# Patient Record
Sex: FEMALE | Race: AMERICAN INDIAN OR ALASKA NATIVE | Employment: FULL TIME | URBAN - METROPOLITAN AREA
[De-identification: names, ages, dates, MRNs, and addresses within clinical notes are randomized per-mention and may not be internally consistent; named-entity substitution may affect disease eponyms.]

---

## 2017-10-10 DIAGNOSIS — H91.90 HEARING LOSS: Primary | ICD-10-CM

## 2017-10-11 ENCOUNTER — PRE VISIT (OUTPATIENT)
Dept: OTOLARYNGOLOGY | Facility: CLINIC | Age: 37
End: 2017-10-11

## 2017-10-11 NOTE — TELEPHONE ENCOUNTER
APPT INFORMATION    Date & Time:  10/20/17 at 9:30AM   Reason for Appt:  Impacted cerumen   Referring Name/Clinic:  self    Yes / No COMMENT / NOTES DATE & ACTION   Patient Contacted?  no  Records in Epic                     RECORDS CLINIC NAME  (use N/A if no records ) DATE & ACTION RECEIVED RECS & IMG? Y/N   (may include other helpful notes)   External Clinics:  n/a                 Internal Clinics:  [facility]   yes - former pt - last seen 3/14/14 -  records in [facility]

## 2017-10-20 ENCOUNTER — OFFICE VISIT (OUTPATIENT)
Dept: OTOLARYNGOLOGY | Facility: CLINIC | Age: 37
End: 2017-10-20

## 2017-10-20 ENCOUNTER — OFFICE VISIT (OUTPATIENT)
Dept: AUDIOLOGY | Facility: CLINIC | Age: 37
End: 2017-10-20
Attending: OTOLARYNGOLOGY

## 2017-10-20 VITALS — HEIGHT: 69 IN | BODY MASS INDEX: 31.1 KG/M2 | WEIGHT: 210 LBS

## 2017-10-20 DIAGNOSIS — H91.90 HEARING LOSS: ICD-10-CM

## 2017-10-20 DIAGNOSIS — D18.1: Primary | ICD-10-CM

## 2017-10-20 DIAGNOSIS — H90.11 CONDUCTIVE HEARING LOSS OF RIGHT EAR WITH UNRESTRICTED HEARING OF LEFT EAR: Primary | ICD-10-CM

## 2017-10-20 DIAGNOSIS — H92.11 OTORRHEA OF RIGHT EAR: ICD-10-CM

## 2017-10-20 ASSESSMENT — PAIN SCALES - GENERAL: PAINLEVEL: MODERATE PAIN (4)

## 2017-10-20 NOTE — PROGRESS NOTES
AUDIOLOGY REPORT    SUMMARY: Audiology visit completed. See audiogram for results.      RECOMMENDATIONS: Follow-up with ENT.    David Chaney  Licensed Audiologist  [place] License #[id number]

## 2017-10-20 NOTE — NURSING NOTE
Chief Complaint   Patient presents with     Consult     impacted ear wax     Jane Altman Medical Assistant

## 2017-10-20 NOTE — LETTER
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 She has undergone significant ear surgery over the years and has had a parotid and neck procedure. There was a significant amount of bleeding with associated with her surgery and she elected not to do any further interventions. Recently, she notes that her right ear is infected and this occurs every month or two in duration. She reports that she has been on antibiotics for at least six months. She has been noted to have increased white counts and some of the visits. She has had both an MRI and CT scan done at [facility].    Physical Examination: Exam today shows that the left tympanic membrane is anatomically intact and looks normal. Her right face is about status quo from my memory. There is swelling in the area of the parotid and the ear canal was abnormal. I was able to clean out the canal and remove a significant amount of cerumen. The tegmen area is soft and causes pain when I elevated. Into the space of the ear canal there is moist debris still medial to the area where I am elevating. The facial remains a House Brackmann class II on the right. The neck has multiple scars and is hard to evaluate. She has not had radiotherapy    I do not have the records or images today.    Impression: This probably represents progression of her lymphangioma.    Plan: I will try to assemble all of her records including the paper records here from the [place], or from [place] and [place], [place]. I will try to arrange for an audiogram now and an appointment with our staff concerning this condition.      SL/ms Lucio Short MD

## 2017-10-20 NOTE — MR AVS SNAPSHOT
After Visit Summary   10/20/2017    Leslie Weathers    MRN: [id number]           Patient Information     Date Of Birth          1980        Visit Information        Provider Department      10/20/2017 9:30 AM Lucio Short MD [facility] Ear Nose and Throat        Care Instructions    1.  PETE filled out at today's visit.   -We will work to obtain recent MRI/CT scans from [facility].  2.  We will also track down your paper chart with the [facility].  3.  Once we receive all of your previous medical files, we will have both Dr. Ragsdale & Dr. Santana review them.  4.  Once both physicians have reviewed your information, we will contact you for an appointment.  5.  Should you have any questions, please call our office: [phone], option # 3.          Follow-ups after your visit        Who to contact     Please call your clinic at [phone] to:    Ask questions about your health    Make or cancel appointments    Discuss your medicines    Learn about your test results    Speak to your doctor   If you have compliments or concerns about an experience at your clinic, or if you wish to file a complaint, please contact [facility] Patient Relations at [phone] or email us at [email or web address]         Additional Information About Your Visit        MyChart Information     Ioxus is an electronic gateway that provides easy, online access to your medical records. With Ioxus, you can request a clinic appointment, read your test results, renew a prescription or communicate with your care team.     To sign up for OnSwipet visit the website at [email or web address]   You will be asked to enter the access code listed below, as well as some personal information. Please follow the directions to create your username and password.     Your access code is: [email or web address]  Expires: 2018  6:31 AM     Your access code will  in 90 days. If you need help or a new  "code, please contact your [facility] or call [phone] for assistance.        Care EveryWhere ID     This is your Care EveryWhere ID. This could be used by other organizations to access your [facility] medical records  [id number]        Your Vitals Were     Height BMI (Body Mass Index)                1.753 m (5' 9\") 31.01 kg/m2           Blood Pressure from Last 3 Encounters:   03/27/12 103/61   03/26/12 130/68   01/11/12 110/70    Weight from Last 3 Encounters:   10/20/17 95.3 kg (210 lb)   03/27/12 107.9 kg (237 lb 14 oz)   03/26/12 106.9 kg (235 lb 10.8 oz)              Today, you had the following     No orders found for display       Primary Care Provider Office Phone # Fax #    Kyle Cardoza [phone]       [address]  [facility] [address]        Equal Access to Services     LEXI BECKHAM : Hadii aad ku hadasho Soomaali, waaxda luqadaha, qaybta kaalmada adeegyada, waxay idiin hayaan lashay del valle . So [facility] [phone].    ATENCIÓN: Si habla español, tiene a short disposición servicios gratuitos de asistencia lingüística. Taniaame al [phone].    We comply with applicable federal civil rights laws and [place] laws. We do not discriminate on the basis of race, color, national origin, age, disability, sex, sexual orientation, or gender identity.            Thank you!     Thank you for choosing [facility] EAR NOSE AND THROAT  for your care. Our goal is always to provide you with excellent care. Hearing back from our patients is one way we can continue to improve our services. Please take a few minutes to complete the written survey that you may receive in the mail after your visit with us. Thank you!             Your Updated Medication List - Protect others around you: Learn how to safely use, store and throw away your medicines at www.disposemymeds.org.      Notice  As of 10/20/2017  9:35 AM    You have not been prescribed any " medications.

## 2017-10-20 NOTE — PROGRESS NOTES
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 She has undergone significant ear surgery over the years and has had a parotid and neck procedure. There was a significant amount of bleeding with associated with her surgery and she elected not to do any further interventions. Recently, she notes that her right ear is infected and this occurs every month or two in duration. She reports that she has been on antibiotics for at least six months. She has been noted to have increased white counts and some of the visits. She has had both an MRI and CT scan done at [facility].    Physical Examination: Exam today shows that the left tympanic membrane is anatomically intact and looks normal. Her right face is about status quo from my memory. There is swelling in the area of the parotid and the ear canal was abnormal. I was able to clean out the canal and remove a significant amount of cerumen. The tegmen area is soft and causes pain when I elevated. Into the space of the ear canal there is moist debris still medial to the area where I am elevating. The facial remains a House Brackmann class II on the right. The neck has multiple scars and is hard to evaluate. She has not had radiotherapy    I do not have the records or images today.    Impression: This probably represents progression of her lymphangioma.    Plan: I will try to assemble all of her records including the paper records here from the [place], or from [place] and [place], [place]. I will try to arrange for an audiogram now and an appointment with our staff concerning this condition.      ABIODUN/ms

## 2017-10-20 NOTE — PATIENT INSTRUCTIONS
1.  PETE filled out at today's visit.   -We will work to obtain recent MRI/CT scans from [facility].  2.  We will also track down your paper chart with the [facility].  3.  Once we receive all of your previous medical files, we will have both Dr. Ragsdale & Dr. Santana review them.  4.  Once both physicians have reviewed your information, we will contact you for an appointment.  5.  Should you have any questions, please call our office: [phone], option # 3.

## 2017-10-20 NOTE — MR AVS SNAPSHOT
After Visit Summary   10/20/2017    Leslie Weathers    MRN: [id number]           Patient Information     Date Of Birth          1980        Visit Information        Provider Department      10/20/2017 8:30 AM Gemini Crowe, Esme LEROY [facility] Audiology        Today's Diagnoses     Conductive hearing loss of right ear with unrestricted hearing of left ear    -  1    Hearing loss           Follow-ups after your visit        Who to contact     Please call your clinic at [phone] to:    Ask questions about your health    Make or cancel appointments    Discuss your medicines    Learn about your test results    Speak to your doctor   If you have compliments or concerns about an experience at your clinic, or if you wish to file a complaint, please contact [facility] Patient Relations at [phone] or email us at [email or web address]         Additional Information About Your Visit        MyChart Information     Conversant Labst is an electronic gateway that provides easy, online access to your medical records. With gamesGRABR, you can request a clinic appointment, read your test results, renew a prescription or communicate with your care team.     To sign up for Conversant Labst visit the website at [email or web address]   You will be asked to enter the access code listed below, as well as some personal information. Please follow the directions to create your username and password.     Your access code is: [email or web address]  Expires: 2018  6:31 AM     Your access code will  in 90 days. If you need help or a new code, please contact your [facility] or call [phone] for assistance.        Care EveryWhere ID     This is your Care EveryWhere ID. This could be used by other organizations to access your [facility] medical records  [id number]         Blood Pressure from Last 3 Encounters:   12 103/61   12 130/68   12 110/70     Weight from Last 3 Encounters:   10/20/17 95.3 kg (210 lb)   03/27/12 107.9 kg (237 lb 14 oz)   03/26/12 106.9 kg (235 lb 10.8 oz)              We Performed the Following     AUDIOGRAM/TYMPANOGRAM - INTERFACE     AUDIOLOGY ADULT REFERRAL     [facility] ([address]     Reduced 52 - Tymps / Reflex   ([id number])        Primary Care Provider Office Phone # Fax #    Kyle Cardoza [phone]       [address]  [facility] [address]        Equal Access to Services     Lanter[facility] Developmental CenterTYLER : Hadii raysa ibanez hadangelitao Sodebby, waaxda luqadaha, qaybta kaalmada adekaterinayada, toi langston. So [facility] [phone].    ATENCIÓN: Si habla español, tiene a short disposición servicios gratuitos de asistencia lingüística. [facility] [phone].    We comply with applicable federal civil rights laws and [place] laws. We do not discriminate on the basis of race, color, national origin, age, disability, sex, sexual orientation, or gender identity.            Thank you!     Thank you for choosing [facility] AUDIOLOGY  for your care. Our goal is always to provide you with excellent care. Hearing back from our patients is one way we can continue to improve our services. Please take a few minutes to complete the written survey that you may receive in the mail after your visit with us. Thank you!             Your Updated Medication List - Protect others around you: Learn how to safely use, store and throw away your medicines at www.disposemymeds.org.      Notice  As of 10/20/2017 11:44 AM    You have not been prescribed any medications.

## 2017-10-20 NOTE — LETTER
10/20/2017       RE: Leslie Weathers     [facility] [address]     Dear Colleague,    Thank you for referring your patient, Leslie Weathers, to the [facility] EAR NOSE AND THROAT at [facility]. Please see a copy of my visit note below.    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 She has undergone significant ear surgery over the years and has had a parotid and neck procedure. There was a significant amount of bleeding with associated with her surgery and she elected not to do any further interventions. Recently, she notes that her right ear is infected and this occurs every month or two in duration. She reports that she has been on antibiotics for at least six months. She has been noted to have increased white counts and some of the visits. She has had both an MRI and CT scan done at [facility].    Physical Examination: Exam today shows that the left tympanic membrane is anatomically intact and looks normal. Her right face is about status quo from my memory. There is swelling in the area of the parotid and the ear canal was abnormal. I was able to clean out the canal and remove a significant amount of cerumen. The tegmen area is soft and causes pain when I elevated. Into the space of the ear canal there is moist debris still medial to the area where I am elevating. The facial remains a House Brackmann class II on the right. The neck has multiple scars and is hard to evaluate. She has not had radiotherapy    I do not have the records or images today.    Impression: This probably represents progression of her lymphangioma.    Plan: I will try to assemble all of her records including the paper records here from the [place], or from [place] and [place], [place]. I will try to arrange for an audiogram now and an appointment with our staff concerning this  condition.      SL/ms    Again, thank you for allowing me to participate in the care of your patient.      Sincerely,    Lucio Short MD

## 2018-01-10 ENCOUNTER — CARE COORDINATION (OUTPATIENT)
Dept: OTOLARYNGOLOGY | Facility: CLINIC | Age: 38
End: 2018-01-10

## 2018-01-10 NOTE — PROGRESS NOTES
Outside scans and records reviewed by Dr. Short . Suggest pt f/u with Head/Neck physician and neuro otologist for evaluation and management : pt with history of lymphangioma of the right parotid, neck and ear. [facility] in [facility].   Pt will be called for scheduling.  Pt notified and understood.  Milagros Phan RN  ENT Care Coordinator   Otology  [phone]  1/10/2018 2:31 PM

## 2018-01-12 DIAGNOSIS — D18.1 LYMPHANGIOMA: Primary | ICD-10-CM
